# Patient Record
Sex: FEMALE | Race: BLACK OR AFRICAN AMERICAN | NOT HISPANIC OR LATINO
[De-identification: names, ages, dates, MRNs, and addresses within clinical notes are randomized per-mention and may not be internally consistent; named-entity substitution may affect disease eponyms.]

---

## 2018-06-21 ENCOUNTER — FORM ENCOUNTER (OUTPATIENT)
Age: 68
End: 2018-06-21

## 2018-06-22 ENCOUNTER — OUTPATIENT (OUTPATIENT)
Dept: OUTPATIENT SERVICES | Facility: HOSPITAL | Age: 68
LOS: 1 days | End: 2018-06-22
Payer: COMMERCIAL

## 2018-06-22 ENCOUNTER — APPOINTMENT (OUTPATIENT)
Dept: CT IMAGING | Facility: CLINIC | Age: 68
End: 2018-06-22

## 2018-06-22 DIAGNOSIS — Z00.8 ENCOUNTER FOR OTHER GENERAL EXAMINATION: ICD-10-CM

## 2018-06-22 PROCEDURE — 71250 CT THORAX DX C-: CPT

## 2019-05-06 ENCOUNTER — EMERGENCY (EMERGENCY)
Facility: HOSPITAL | Age: 69
LOS: 1 days | Discharge: HOME | End: 2019-05-06
Attending: EMERGENCY MEDICINE | Admitting: EMERGENCY MEDICINE
Payer: COMMERCIAL

## 2019-05-06 VITALS
HEART RATE: 77 BPM | OXYGEN SATURATION: 99 % | SYSTOLIC BLOOD PRESSURE: 142 MMHG | RESPIRATION RATE: 15 BRPM | DIASTOLIC BLOOD PRESSURE: 48 MMHG

## 2019-05-06 VITALS
TEMPERATURE: 98 F | HEART RATE: 76 BPM | DIASTOLIC BLOOD PRESSURE: 80 MMHG | RESPIRATION RATE: 18 BRPM | OXYGEN SATURATION: 100 % | SYSTOLIC BLOOD PRESSURE: 140 MMHG

## 2019-05-06 PROCEDURE — 99284 EMERGENCY DEPT VISIT MOD MDM: CPT

## 2019-05-06 PROCEDURE — 70450 CT HEAD/BRAIN W/O DYE: CPT | Mod: 26

## 2019-05-06 RX ORDER — ACETAMINOPHEN 500 MG
650 TABLET ORAL ONCE
Qty: 0 | Refills: 0 | Status: COMPLETED | OUTPATIENT
Start: 2019-05-06 | End: 2019-05-06

## 2019-05-06 RX ADMIN — Medication 650 MILLIGRAM(S): at 21:27

## 2019-05-06 NOTE — ED PROVIDER NOTE - OBJECTIVE STATEMENT
70 y/o female pmh htn, arthritis, asthma presents with c/o left sided headache/dizziness/ blurry vision s/p hitting left side of her forehead on sun-visor while sitting down in her car, states that she got into her car, turned her head quickly and hit her head against the visor, symptoms lasted few minutes, pulled over her car, called 911, states that most symptoms have dissipated, however she still feels some headache, pain to side of her head, denies any current visual disturbances,  fevers, chills, cough, n/v/d, chest pain, sob, abdominal pain, urinary symptoms, numbness/weakness/tingling, recent travel, sick contact, social history

## 2019-05-06 NOTE — ED ADULT TRIAGE NOTE - CHIEF COMPLAINT QUOTE
Patient reports hitting her head on visor of car seat and feeling dizzy 40 minutes ago. Patient currently is a&ox4, able to converse appropriately.

## 2019-05-06 NOTE — ED PROVIDER NOTE - PMH
Asthma  denies Hx of intubations (as of 08/25/2010  Herniated Disc  at lower back  HTN (Hypertension)

## 2020-04-09 NOTE — ED ADULT NURSE NOTE - PRIMARY CARE PROVIDER
Call made to , Jt (#[id number]) to call patient for GDM education, no answer, LM to call Lavern [phone] and will then add on a an . [phone] Option # 1    Plan is to have   1. Care coordinator referral for help with insuance  2. Endo referral   ( the above 2 are being take care of by Nydia from Dr. Ferro's clinic)  3. Help in the meantime with patient assistance program via [facility] and/or Ri[facility]  ( I did call and leave a message)  4. Once we know Gloria's BG we can then request start of insulin, send in a phone encounter to the pool of Dr. Ferro to get the approval to start)  5. Continue with meal planning and education for GDM  6. Check her BG and record  7. Verify her follow up appointment with Jada 4/15 @ 2:00     Lavern Rosas RN/CAYLA  [facility]     nos

## 2022-05-19 NOTE — ED PROVIDER NOTE - NEUROLOGICAL MOTOR
[FreeTextEntry3] : This note was written by Yuliana Maya on 05/13/2022 acting solely as a scribe for Dr. Mike Banks.\par  \par All medical record entries made by the Scribe were at my, Dr. Mike Banks, direction and personally dictated by me on 05/13/2022. I have personally reviewed the chart and agree that the record accurately reflects my personal performance of the history, physical exam, assessment and plan. 
normal